# Patient Record
Sex: FEMALE | Race: WHITE
[De-identification: names, ages, dates, MRNs, and addresses within clinical notes are randomized per-mention and may not be internally consistent; named-entity substitution may affect disease eponyms.]

---

## 2017-08-23 ENCOUNTER — HOSPITAL ENCOUNTER (EMERGENCY)
Dept: HOSPITAL 11 - JP.ED | Age: 57
Discharge: NURSING, REHAB OR LONG-TERM CARE | End: 2017-08-23
Payer: COMMERCIAL

## 2017-08-23 VITALS — SYSTOLIC BLOOD PRESSURE: 120 MMHG | DIASTOLIC BLOOD PRESSURE: 77 MMHG

## 2017-08-23 DIAGNOSIS — Z98.51: ICD-10-CM

## 2017-08-23 DIAGNOSIS — M19.90: ICD-10-CM

## 2017-08-23 DIAGNOSIS — Z90.721: ICD-10-CM

## 2017-08-23 DIAGNOSIS — Z88.1: ICD-10-CM

## 2017-08-23 DIAGNOSIS — Z98.890: ICD-10-CM

## 2017-08-23 DIAGNOSIS — E11.9: ICD-10-CM

## 2017-08-23 DIAGNOSIS — Z79.84: ICD-10-CM

## 2017-08-23 DIAGNOSIS — Z79.4: ICD-10-CM

## 2017-08-23 DIAGNOSIS — N39.0: ICD-10-CM

## 2017-08-23 DIAGNOSIS — Z85.42: ICD-10-CM

## 2017-08-23 DIAGNOSIS — Z90.710: ICD-10-CM

## 2017-08-23 DIAGNOSIS — Z79.899: ICD-10-CM

## 2017-08-23 DIAGNOSIS — N13.2: Primary | ICD-10-CM

## 2017-08-23 DIAGNOSIS — Z88.8: ICD-10-CM

## 2017-08-23 PROCEDURE — 81001 URINALYSIS AUTO W/SCOPE: CPT

## 2017-08-23 PROCEDURE — 96375 TX/PRO/DX INJ NEW DRUG ADDON: CPT

## 2017-08-23 PROCEDURE — 86140 C-REACTIVE PROTEIN: CPT

## 2017-08-23 PROCEDURE — 85025 COMPLETE CBC W/AUTO DIFF WBC: CPT

## 2017-08-23 PROCEDURE — 83605 ASSAY OF LACTIC ACID: CPT

## 2017-08-23 PROCEDURE — 99285 EMERGENCY DEPT VISIT HI MDM: CPT

## 2017-08-23 PROCEDURE — 36415 COLL VENOUS BLD VENIPUNCTURE: CPT

## 2017-08-23 PROCEDURE — 96361 HYDRATE IV INFUSION ADD-ON: CPT

## 2017-08-23 PROCEDURE — 83735 ASSAY OF MAGNESIUM: CPT

## 2017-08-23 PROCEDURE — 74176 CT ABD & PELVIS W/O CONTRAST: CPT

## 2017-08-23 PROCEDURE — 80053 COMPREHEN METABOLIC PANEL: CPT

## 2017-08-23 PROCEDURE — 82150 ASSAY OF AMYLASE: CPT

## 2017-08-23 PROCEDURE — 96374 THER/PROPH/DIAG INJ IV PUSH: CPT

## 2017-08-23 PROCEDURE — 87040 BLOOD CULTURE FOR BACTERIA: CPT

## 2017-08-23 PROCEDURE — 83690 ASSAY OF LIPASE: CPT

## 2017-08-23 NOTE — EDM.PDOC
ED HPI GENERAL MEDICAL PROBLEM





- General


Chief Complaint: Genitourinary Problem


Stated Complaint: KIDNEY INFECTION


Time Seen by Provider: 08/23/17 21:17


Source of Information: Reports: Patient, Family ()


History Limitations: Reports: No Limitations





- History of Present Illness


INITIAL COMMENTS - FREE TEXT/NARRATIVE: 





Urinary Tract Infection getting worse: This is a 56 year old female present to 

ER via POV with . 


Reports one week ago had left side abdominal pain, on 8/21/2017 seen in Urgent 

[facility], treated for UTI with Cefdinir, didnt improve with meds was seen by Ur[facility] 

[facility] today, urine culture show E.Coli positive, medication change to septra ds 

and given Rocephin 1 gram IM.





This evening, developed profuse sweating, chills, nausea, worsen abdominal 

pain. 





past history


07/2015 ovarian cancer, chemotherapy x 6 months


03/2016 DUNCAN, complication abscess ([facility])


03/2017; episode of vaginal bleeding, given single dose of radiation


last CT scan 6/29/2017 at [facility], no significant changes, recommendat 

abdomen/pelvis CT every 3 months. 


Onset: Gradual


Duration: Week(s): (one), Getting Worse


Location: Reports: Abdomen, Generalized (this evening profuse sweating/ chills)


Severity: Moderate


Improves with: Reports: None


Worsens with: Reports: None


Associated Symptoms: Reports: Diaphoresis, Fever/Chills, Loss of Appetite, 

Nausea/Vomiting, Weakness


Treatments PTA: Reports: Other Medication(s)


  ** lower abdomen


Pain Score (Numeric/FACES): 6





- Related Data


 Allergies











Allergy/AdvReac Type Severity Reaction Status Date / Time


 


beclomethasone Allergy  Hives Verified 04/12/16 23:19


 


ciprofloxacin [From Cipro] Allergy  Cannot Verified 08/23/17 21:26





   Remember  


 


nitrofurantoin Allergy  Cannot Verified 08/23/17 21:26





[From Macrobid]   Remember  


 


paclitaxel [From Taxol] Allergy  Hypotension Verified 08/23/17 21:26


 


prochlorperazine Allergy  Hives Verified 04/12/16 23:19





[From Compazine]     


 


prochlorperazine edisylate Allergy  Hives Verified 04/12/16 23:19





[From Compazine]     


 


prochlorperazine maleate Allergy  Hives Verified 04/12/16 23:19





[From Compazine]     


 


cefdinir AdvReac  Diarrhea Verified 08/23/17 21:08


 


pioglitazone AdvReac  Pain Verified 04/12/16 23:19











Home Meds: 


 Home Meds





Insulin Aspart [NovoLOG] 4 units SUBCUT ASDIRECTED 07/18/15 [History]


Insulin Detemir [Levemir Flextouch] 70 units SUBCUT BEDTIME 07/18/15 [History]


Losartan/Hydrochlorothiazide [Hyzaar 50-12.5 Tablet] 1 tab PO BEDTIME 07/18/15 [

History]


Glucagon,Human Recombinant [Glucagon Emergency Kit] 1 mg IM ASDIRECTED 09/04/15 

[History]


Brimonidine [Alphagan P 0.15% Ophth Soln] 1 drop OP BID 04/12/16 [History]


Alpha Lipoic Acid 2 caplet PO DAILY 08/23/17 [History]


Fluticasone Propionate [Flonase] 2 spray SHERINE DAILY 08/23/17 [History]


L.acidoph,Paracasei, B.lactis [Probiotic] 1 tab PO DAILY 08/23/17 [History]


Metoclopramide HCl [Reglan] 1 tab PO QID 08/23/17 [History]


Sulfamethoxazole/Trimethoprim [Bactrim Ds Tablet] 1 tab PO BID 08/23/17 [History

]


metFORMIN HCl [Glucophage XR] 1 tab PO BID 08/23/17 [History]











Past Medical History


HEENT History: Reports: Impaired Vision, Other (See Below)


Other HEENT History: pre-glaucoma


Respiratory History: Reports: Other (See Below)


Other Respiratory History: tumors x2 on each lung, 1cm in size, responding to 

chemo. chemo completed in December 2015


Gastrointestinal History: Reports: Irritable Bowel Syndrome


Genitourinary History: Reports: UTI, Recurrent


OB/GYN History: Reports: Pregnancy


Other OB/BYN History: total hysterectomy at [facility]


Musculoskeletal History: Reports: Arthritis


Other Musculoskeletal History: Carpal tunnel


Endocrine/Metabolic History: Reports: Diabetes, Type II


Hematologic History: Reports: Anemia, Blood Transfusion(s)


Other Hematologic History: anemic with recent chemo


Immunologic History: Reports: Immunosuppression


Other Immunologic History: immunosuppression due to chemo.  chemo ended 

december 2015


Oncologic (Cancer) History: Reports: Uterine


Other Oncologic History: tumors in lungs bilaterally (see above)





- Infectious Disease History


Infectious Disease History: Reports: Chicken Pox, Measles, Mumps





- Past Surgical History


Female  Surgical History: Reports: Hysterectomy, Oophorectomy, Tubal Ligation


Musculoskeletal Surgical History: Reports: Carpal Tunnel, Other (See Below)


Other Musculoskeletal Surgeries/Procedures:: plantar fasciitis surgery





Social & Family History





- Family History


Cardiac: Reports: CAD


Endocrine/Metabolic: Reports: Diabetes, type II


Oncologic: Reports: Breast, Lung





- Tobacco Use


Smoking Status *Q: Never Smoker


Second Hand Smoke Exposure: No





- Recreational Drug Use


Recreational Drug Use: No





- Living Situation & Occupation


Living situation: Reports:  (lives with  between [place] and 

[place], [place].)





ED ROS GENERAL





- Review of Systems


Review Of Systems: See Below


Constitutional: Reports: Chills, Weakness, Fatigue, Diaphoresis, Decreased 

Appetite, Weight Loss (six pounds in 2 days)


HEENT: Reports: No Symptoms


Respiratory: Reports: No Symptoms


Cardiovascular: Reports: No Symptoms


Endocrine: Reports: Fatigue, High Glucose (blood glucose in 377 at home)


GI/Abdominal: Reports: Abdominal Pain (left sided abd pain), Diarrhea (from 

Cefdinir), Decreased Appetite, Nausea


: Reports: Dysuria, Frequency, Urgency, Other (current treatment for UTI)


Musculoskeletal: Reports: Back Pain


Skin: Reports: Pallor, Diaphoresis


Neurological: Reports: Weakness


Psychiatric: Reports: No Symptoms


Hematologic/Lymphatic: Reports: No Symptoms


Immunologic: Reports: No Symptoms





ED EXAM, GENERAL





- Physical Exam


Exam: See Below


Exam Limited By: No Limitations


General Appearance: Alert, Mild Distress, Obese, Other (very pale, profuse 

sweating, cool and clammy)


Eye Exam: Bilateral Eye: Normal Inspection, PERRL


Ears: Normal External Exam, Normal Canal, Hearing Grossly Normal, Normal TMs


Ear Exam: Bilateral Ear: Auricle Normal, Canal Normal, TM normal


Nose: Normal Inspection, Normal Mucosa, No Blood


Throat/Mouth: Normal Inspection


Head: Atraumatic, Normocephalic


Neck: Normal Inspection, Supple, Non-Tender, Full Range of Motion


Respiratory/Chest: No Respiratory Distress, Lungs Clear, Normal Breath Sounds, 

No Accessory Muscle Use, Chest Non-Tender


Cardiovascular: Regular Rate, Rhythm, Tachycardia (rate 130)


GI/Abdominal: Normal Bowel Sounds, Soft, Non-Tender


 (Female) Exam: Deferred


Rectal (Female) Exam: Deferred


Back Exam: Normal Inspection, Full Range of Motion


Extremities: Normal Inspection, Normal Range of Motion, Non-Tender, Pedal Edema

, Slow Capillary Refill


Neurological: Alert, Oriented, Normal Cognition, Normal Gait


Psychiatric: Normal Affect, Normal Mood


Skin Exam: Cool, Pallor


Lymphatic: No Adenopathy





Course





- Vital Signs


Last Recorded V/S: 





 Last Vital Signs











Temp  35.6 C   08/23/17 22:11


 


Pulse  109 H  08/23/17 22:11


 


Resp  16   08/23/17 22:11


 


BP  121/71   08/23/17 22:11


 


Pulse Ox  93 L  08/23/17 22:11














- Orders/Labs/Meds


Orders: 





 Active Orders 24 hr











 Category Date Time Status


 


 Abdomen Pelvis wo Cont [CT] Stat Exams  08/23/17 21:21 Taken


 


 CULTURE BLOOD [BC] Urgent Lab  08/23/17 21:10 Received


 


 CULTURE BLOOD [BC] Urgent Lab  08/23/17 21:10 Received


 


 UA W/MICROSCOPIC [URIN] Urgent Lab  08/23/17 22:49 Ordered


 


 Magnesium Sulfate/Water [Magnesium Sulfate 2 GM in Med  08/23/17 23:07 Ordered





 Water 50 ML] 2 gm   





 Premix Bag 1 bag   





 IV ONETIME   


 


 Potassium Chloride [KCL 20 MEQ in Water 100 ML] 20 meq Med  08/23/17 22:28 

Active





 Premix Bag 1 bag   





 IV ONETIME   


 


 Sodium Chloride 0.9% [Normal Saline] 1,000 ml Med  08/23/17 21:30 Active





 IV ASDIRECTED   


 


 Sodium Chloride 0.9% [Saline Flush] Med  08/23/17 21:16 Active





 10 ml FLUSH ASDIRECTED PRN   


 


 Blood Culture x2 Reflex Set [OM.PC] Urgent Oth  08/23/17 21:14 Ordered


 


 Saline Lock Insert [OM.PC] Routine Oth  08/23/17 21:16 Ordered








 Medication Orders





Sodium Chloride (Normal Saline)  1,000 mls @ 999 mls/hr IV ASDIRECTED [facility]


   Last Admin: 08/23/17 22:16  Dose: 999 mls/hr


Potassium Chloride 20 meq/ (Premix)  100 mls @ 50 mls/hr IV ONETIME ONE


   Stop: 08/24/17 00:27


   Last Admin: 08/23/17 22:59  Dose: 50 mls/hr


Magnesium Sulfate 2 gm/ Premix  50 mls @ 12.5 mls/hr IV ONETIME ONE


   Stop: 08/24/17 03:06


Sodium Chloride (Saline Flush)  10 ml FLUSH ASDIRECTED PRN


   PRN Reason: Keep Vein Open


   Last Admin: 08/23/17 22:17  Dose: 10 ml








Labs: 





 Laboratory Tests











  08/23/17 08/23/17 08/23/17 Range/Units





  21:10 21:10 21:10 


 


WBC   6.6   (4.5-11.0)  K/uL


 


RBC   3.56   (3.30-5.50)  M/uL


 


Hgb   11.1 L   (12.0-15.0)  g/dL


 


Hct   32.1 L   (36.0-48.0)  %


 


MCV   90   (80-98)  fL


 


MCH   31   (27-31)  pg


 


MCHC   35   (32-36)  %


 


Plt Count   110 L   (150-400)  K/uL


 


Neut % (Auto)   93 H   (36-66)  %


 


Lymph % (Auto)   3 L   (24-44)  %


 


Mono % (Auto)   4   (2-6)  %


 


Eos % (Auto)   1 L   (2-4)  %


 


Baso % (Auto)   0   (0-1)  %


 


Sodium    122 L  (140-148)  mmol/L


 


Potassium    3.3 L  (3.6-5.2)  mmol/L


 


Chloride    89 L  (100-108)  mmol/L


 


Carbon Dioxide    24  (21-32)  mmol/L


 


Anion Gap    12.3  (5.0-14.0)  mmol/L


 


BUN    35 H D  (7-18)  mg/dL


 


Creatinine    2.1 H D  (0.6-1.0)  mg/dL


 


Est Cr Clr Drug Dosing    28.40  mL/min


 


Estimated GFR (MDRD)    24 L  (>60)  


 


Glucose    398 H  ()  mg/dL


 


Lactic Acid  2.4 H    (0.4-2.0)  mmol/L


 


Calcium    8.6  (8.5-10.1)  mg/dL


 


Magnesium     (1.8-2.4)  mg/dL


 


Total Bilirubin    1.1 H D  (0.2-1.0)  mg/dL


 


AST    21  (15-37)  U/L


 


ALT    25  (12-78)  U/L


 


Alkaline Phosphatase    75  ()  U/L


 


C-Reactive Protein     (0.0-0.3)  mg/dL


 


Total Protein    7.1  (6.4-8.2)  g/dL


 


Albumin    2.9 L  (3.4-5.0)  g/dL


 


Globulin    4.2 H  (2.3-3.5)  g/dL


 


Albumin/Globulin Ratio    0.7 L  (1.2-2.2)  


 


Amylase    27  ()  U/L


 


Lipase    227  ()  U/L














  08/23/17 08/23/17 Range/Units





  22:01 22:23 


 


WBC    (4.5-11.0)  K/uL


 


RBC    (3.30-5.50)  M/uL


 


Hgb    (12.0-15.0)  g/dL


 


Hct    (36.0-48.0)  %


 


MCV    (80-98)  fL


 


MCH    (27-31)  pg


 


MCHC    (32-36)  %


 


Plt Count    (150-400)  K/uL


 


Neut % (Auto)    (36-66)  %


 


Lymph % (Auto)    (24-44)  %


 


Mono % (Auto)    (2-6)  %


 


Eos % (Auto)    (2-4)  %


 


Baso % (Auto)    (0-1)  %


 


Sodium    (140-148)  mmol/L


 


Potassium    (3.6-5.2)  mmol/L


 


Chloride    (100-108)  mmol/L


 


Carbon Dioxide    (21-32)  mmol/L


 


Anion Gap    (5.0-14.0)  mmol/L


 


BUN    (7-18)  mg/dL


 


Creatinine    (0.6-1.0)  mg/dL


 


Est Cr Clr Drug Dosing    mL/min


 


Estimated GFR (MDRD)    (>60)  


 


Glucose    ()  mg/dL


 


Lactic Acid    (0.4-2.0)  mmol/L


 


Calcium    (8.5-10.1)  mg/dL


 


Magnesium   1.0 L  (1.8-2.4)  mg/dL


 


Total Bilirubin    (0.2-1.0)  mg/dL


 


AST    (15-37)  U/L


 


ALT    (12-78)  U/L


 


Alkaline Phosphatase    ()  U/L


 


C-Reactive Protein  17.69 H   (0.0-0.3)  mg/dL


 


Total Protein    (6.4-8.2)  g/dL


 


Albumin    (3.4-5.0)  g/dL


 


Globulin    (2.3-3.5)  g/dL


 


Albumin/Globulin Ratio    (1.2-2.2)  


 


Amylase    ()  U/L


 


Lipase    ()  U/L











Meds: 





Medications











Generic Name Dose Route Start Last Admin





  Trade Name Freq  PRN Reason Stop Dose Admin


 


Sodium Chloride  1,000 mls @ 999 mls/hr  08/23/17 21:30  08/23/17 22:16





  Normal Saline  IV   999 mls/hr





  ASDIRECTED JOSHUA   Administration


 


Potassium Chloride 20 meq/  100 mls @ 50 mls/hr  08/23/17 22:28  08/23/17 22:59





  Premix  IV  08/24/17 00:27  50 mls/hr





  ONETIME ONE   Administration


 


Magnesium Sulfate 2 gm/ Premix  50 mls @ 12.5 mls/hr  08/23/17 23:07  





  IV  08/24/17 03:06  





  ONETIME ONE   


 


Sodium Chloride  10 ml  08/23/17 21:16  08/23/17 22:17





  Saline Flush  FLUSH   10 ml





  ASDIRECTED PRN   Administration





  Keep Vein Open   














Discontinued Medications














Generic Name Dose Route Start Last Admin





  Trade Name Nathan  PRN Reason Stop Dose Admin


 


Insulin Aspart  15 unit  08/23/17 22:26  





  Novolog  SUBCUT  08/23/17 22:27  





  ONETIME ONE   


 


Insulin Human Regular  15 unit  08/23/17 22:59  





  Novolin R  SUBCUT  08/23/17 23:00  





  ONETIME ONE   





  Protocol   


 


Lidocaine HCl  2 ml  08/23/17 22:37  08/23/17 23:13





  Xylocaine-Mpf 1%  INJECT  08/23/17 22:38  2 ml





  ONETIME ONE   Administration


 


Ondansetron HCl  4 mg  08/23/17 21:59  08/23/17 22:20





  Zofran  IVPUSH  08/23/17 22:00  4 mg





  ONETIME ONE   Administration














- Re-Assessments/Exams


Free Text/Narrative Re-Assessment/Exam: 





08/23/17 23:29


sepsis work up began on arrival to ER due to diaphoresis.


IV fluids; Normal Saline at 999ml/hr


labs and blood culture ordered


Imaging CT scan.





reports


urosepsis with obstructing 11 milllimeter obstructing calculus left UPJ with  

moderate left hydronephrosis. 3 millimeter nonobstrucitng claculus inferior 

pole left kidney.





Consulted with Hospitalist, recommends transfer as Urology service not 

available at this Hospital


Contacted [facility], Dr. Talbert will accept patient for further care 

and treatment


reviewed with  and Mrs. Wright, agree with plan of care and transport to 

higher level of care.





Medication


-insulin regular 20 units subcut


-Potassium chloride 20 meq iv now


-Magnesium sulfate 2 gram iv now 


-Iv fluids








Departure





- Departure


Time of Disposition: 23:36


Disposition: DC/Tfer to Acute Hospital 02


Condition: Fair


Clinical Impression: 


 Kidney stone, Sepsis due to urinary tract infection, Hydronephrosis with renal 

calculous obstruction








- Discharge Information


Forms:  ED Department Discharge





- My Orders


Last 24 Hours: 





My Active Orders





08/23/17 21:10


CULTURE BLOOD [BC] Urgent 


CULTURE BLOOD [BC] Urgent 





08/23/17 21:14


Blood Culture x2 Reflex Set [OM.PC] Urgent 





08/23/17 21:16


Sodium Chloride 0.9% [Saline Flush]   10 ml FLUSH ASDIRECTED PRN 


Saline Lock Insert [OM.PC] Routine 





08/23/17 21:21


Abdomen Pelvis wo Cont [CT] Stat 





08/23/17 21:30


Sodium Chloride 0.9% [Normal Saline] 1,000 ml IV ASDIRECTED 





08/23/17 22:28


Potassium Chloride [KCL 20 MEQ in Water 100 ML] 20 meq   Premix Bag 1 bag IV 

ONETIME 





08/23/17 22:49


UA W/MICROSCOPIC [URIN] Urgent 





08/23/17 23:07


Magnesium Sulfate/Water [Magnesium Sulfate 2 GM in Water 50 ML] 2 gm   Premix 

Bag 1 bag IV ONETIME 














- Assessment/Plan


Last 24 Hours: 





My Active Orders





08/23/17 21:10


CULTURE BLOOD [BC] Urgent 


CULTURE BLOOD [BC] Urgent 





08/23/17 21:14


Blood Culture x2 Reflex Set [OM.PC] Urgent 





08/23/17 21:16


Sodium Chloride 0.9% [Saline Flush]   10 ml FLUSH ASDIRECTED PRN 


Saline Lock Insert [OM.PC] Routine 





08/23/17 21:21


Abdomen Pelvis wo Cont [CT] Stat 





08/23/17 21:30


Sodium Chloride 0.9% [Normal Saline] 1,000 ml IV ASDIRECTED 





08/23/17 22:28


Potassium Chloride [KCL 20 MEQ in Water 100 ML] 20 meq   Premix Bag 1 bag IV 

ONETIME 





08/23/17 22:49


UA W/MICROSCOPIC [URIN] Urgent 





08/23/17 23:07


Magnesium Sulfate/Water [Magnesium Sulfate 2 GM in Water 50 ML] 2 gm   Premix 

Bag 1 bag IV ONETIME

## 2022-06-18 ENCOUNTER — HOSPITAL ENCOUNTER (EMERGENCY)
Dept: HOSPITAL 11 - JP.ED | Age: 62
Discharge: HOME | End: 2022-06-18
Payer: COMMERCIAL

## 2022-06-18 VITALS — DIASTOLIC BLOOD PRESSURE: 61 MMHG | HEART RATE: 68 BPM | SYSTOLIC BLOOD PRESSURE: 171 MMHG

## 2022-06-18 DIAGNOSIS — Z79.4: ICD-10-CM

## 2022-06-18 DIAGNOSIS — E78.00: ICD-10-CM

## 2022-06-18 DIAGNOSIS — Z88.8: ICD-10-CM

## 2022-06-18 DIAGNOSIS — R07.89: Primary | ICD-10-CM

## 2022-06-18 DIAGNOSIS — K21.9: ICD-10-CM

## 2022-06-18 DIAGNOSIS — Z79.899: ICD-10-CM

## 2022-06-18 DIAGNOSIS — Z88.0: ICD-10-CM

## 2022-06-18 DIAGNOSIS — E11.9: ICD-10-CM

## 2022-06-18 DIAGNOSIS — I10: ICD-10-CM

## 2022-06-18 LAB — TROPONIN I SERPL HS-MCNC: 12.8 PG/ML (ref ?–60.3)

## 2023-08-03 ENCOUNTER — HOSPITAL ENCOUNTER (EMERGENCY)
Dept: HOSPITAL 11 - JP.ED | Age: 63
Discharge: HOME | End: 2023-08-03
Payer: COMMERCIAL

## 2023-08-03 VITALS — SYSTOLIC BLOOD PRESSURE: 176 MMHG | DIASTOLIC BLOOD PRESSURE: 54 MMHG | HEART RATE: 78 BPM

## 2023-08-03 DIAGNOSIS — Z88.8: ICD-10-CM

## 2023-08-03 DIAGNOSIS — I10: ICD-10-CM

## 2023-08-03 DIAGNOSIS — K21.9: ICD-10-CM

## 2023-08-03 DIAGNOSIS — Z88.1: ICD-10-CM

## 2023-08-03 DIAGNOSIS — Z79.4: ICD-10-CM

## 2023-08-03 DIAGNOSIS — T45.7X5D: ICD-10-CM

## 2023-08-03 DIAGNOSIS — E11.9: ICD-10-CM

## 2023-08-03 DIAGNOSIS — Z79.899: ICD-10-CM

## 2023-08-03 DIAGNOSIS — R04.0: Primary | ICD-10-CM

## 2023-08-03 DIAGNOSIS — Z86.16: ICD-10-CM

## 2023-08-03 LAB
APTT PPP: 23.4 SEC (ref 21.8–27.3)
BASOPHILS # BLD AUTO: 0.03 K/UL (ref 0–0.1)
BASOPHILS NFR BLD AUTO: 0.5 % (ref 0.1–1.3)
EOSINOPHIL # BLD AUTO: 0.43 K/UL (ref 0–0.4)
EOSINOPHIL NFR BLD AUTO: 6.7 % (ref 0–5.4)
HCT VFR BLD AUTO: 35.2 % (ref 34.3–46)
HGB BLD-MCNC: 12.3 G/DL (ref 11.2–15.5)
IMM GRANULOCYTES # BLD: 0.04 K/UL (ref 0–0.23)
IMM GRANULOCYTES NFR BLD: 0.6 % (ref 0–0.7)
INR PPP: 1
LYMPHOCYTES # BLD AUTO: 1.31 K/UL (ref 0.8–3.3)
LYMPHOCYTES NFR BLD AUTO: 20.5 % (ref 11.4–47.7)
MCH RBC QN AUTO: 32.5 PG (ref 31.6–35.5)
MCHC RBC AUTO-ENTMCNC: 34.9 G/DL (ref 31.6–35.5)
MCHC RBC AUTO-ENTMCNC: 93.1 FL (ref 81.4–99)
MONOCYTES # BLD AUTO: 0.4 K/UL (ref 0.2–0.9)
MONOCYTES NFR BLD AUTO: 6.3 % (ref 3.3–12.6)
NEUTROPHILS # BLD AUTO: 4.19 K/UL (ref 1–7.6)
NEUTROPHILS NFR BLD AUTO: 65.4 % (ref 40–78.1)
PLATELET # BLD AUTO: 203 K/UL (ref 130–375)
PROTHROMBIN TIME: 9.9 SEC (ref 9.2–10.6)
RBC # BLD AUTO: 3.78 M/UL (ref 3.77–5.24)
WBC # BLD AUTO: 6.4 K/UL (ref 3.2–11)

## 2023-08-03 PROCEDURE — 85610 PROTHROMBIN TIME: CPT

## 2023-08-03 PROCEDURE — 36415 COLL VENOUS BLD VENIPUNCTURE: CPT

## 2023-08-03 PROCEDURE — 30901 CONTROL OF NOSEBLEED: CPT

## 2023-08-03 PROCEDURE — 99283 EMERGENCY DEPT VISIT LOW MDM: CPT

## 2023-08-03 PROCEDURE — 85730 THROMBOPLASTIN TIME PARTIAL: CPT

## 2023-08-03 PROCEDURE — 85025 COMPLETE CBC W/AUTO DIFF WBC: CPT
